# Patient Record
Sex: FEMALE | Race: WHITE | NOT HISPANIC OR LATINO | Employment: FULL TIME | URBAN - METROPOLITAN AREA
[De-identification: names, ages, dates, MRNs, and addresses within clinical notes are randomized per-mention and may not be internally consistent; named-entity substitution may affect disease eponyms.]

---

## 2022-07-20 ENCOUNTER — HOSPITAL ENCOUNTER (EMERGENCY)
Facility: CLINIC | Age: 32
Discharge: HOME | End: 2022-07-20
Admitting: PHYSICIAN ASSISTANT
Payer: COMMERCIAL

## 2022-07-20 VITALS
HEART RATE: 113 BPM | BODY MASS INDEX: 34.15 KG/M2 | WEIGHT: 200 LBS | HEIGHT: 64 IN | DIASTOLIC BLOOD PRESSURE: 101 MMHG | SYSTOLIC BLOOD PRESSURE: 153 MMHG | TEMPERATURE: 98.5 F | OXYGEN SATURATION: 98 %

## 2022-07-20 DIAGNOSIS — S61.208A AVULSION OF SKIN OF MIDDLE FINGER, INITIAL ENCOUNTER: ICD-10-CM

## 2022-07-20 PROCEDURE — 64450 NJX AA&/STRD OTHER PN/BRANCH: CPT | Mod: LT

## 2022-07-20 PROCEDURE — 272N000004 HC RX 272: Performed by: PHYSICIAN ASSISTANT

## 2022-07-20 PROCEDURE — 99283 EMERGENCY DEPT VISIT LOW MDM: CPT | Mod: 25

## 2022-07-20 RX ADMIN — Medication: at 18:15

## 2022-07-20 ASSESSMENT — ENCOUNTER SYMPTOMS
BRUISES/BLEEDS EASILY: 0
NUMBNESS: 0
WOUND: 1
WEAKNESS: 0

## 2022-07-20 NOTE — ED PROVIDER NOTES
EMERGENCY DEPARTMENT ENCOUNTER      NAME: Juanita Aviles  AGE: 32 year old female  YOB: 1990  MRN: [id number]  EVALUATION DATE & TIME: 7/20/2022  5:17 PM    PCP: No primary care provider on file.    ED PROVIDER: Geraldine Encinas PA-C      Chief Complaint   Patient presents with     Laceration         FINAL IMPRESSION:  1. Avulsion of skin of middle finger, initial encounter          MEDICAL DECISION MAKING:    Pertinent Labs & Imaging studies reviewed. (See chart for details)  32 year old female presents to the Emergency Department for evaluation of laceration to left middle finger while using a mandolin to cut vegetables.      Vitals reviewed and notable for elevated blood pressure and tachycardia. Patient is anxious about her laceration. On exam, 1 cm x 0.5 cm superficial skin avulsion to dorsum of left middle finger of the DIP. Bleeding controlled with direct pressure. Full ROM against resistance. Brisk capillary refill. Distal sensation intact.     1 ml lidocaine with epi injected into the wound for hemostasis and pain control. Wound was cleaned. Unable to perform primary closure due to skin avulsion. Surgicel pressure dressing placed over the wound. Instructions to remove dressing in 48 hours under running water to prevent scab from falling off. Bacitracin once bandage is removed. Discussed return precautions and patient discharged home in stable condition.     0 minutes of critical care time     ED COURSE:  5:46 PM I met with the patient, obtained history, performed an initial exam, and discussed options and plan for diagnostics and treatment here in the ED.  6:17 PM Patient discharged after being provided with extensive anticipatory guidance and given return precautions, importance of PCP follow-up emphasized.    At the conclusion of the encounter I discussed the results of all of the tests and the disposition. The questions were answered. The patient acknowledged understanding and was agreeable  "with the care plan.     MEDICATIONS GIVEN IN THE EMERGENCY:  Medications   oxidized cellulose (SURGICEL) pad ( Topical Given 7/20/22 1815)       NEW PRESCRIPTIONS STARTED AT TODAY'S ER VISIT  There are no discharge medications for this patient.           =================================================================    HPI:    Patient information was obtained from: Patient    Use of Interpretor: N/A      Juanitajoaquín Aviles is a 32 year old female with no pertinent history who presents to this ED via private vehicle for evaluation of laceration.    Patient was using a Mandolin to cut vegetables. The guard fell off and she knicked the dorsal aspect of her left middle finger over the DIP joint. Denies pain. Bleeding controlled with direct pressure. Full ROM. Not anticoagulated.     Tdap 2018.    REVIEW OF SYSTEMS:  Review of Systems   Skin: Positive for wound (laceration to left middle finger).   Neurological: Negative for weakness and numbness.   Hematological: Does not bruise/bleed easily.   All other systems reviewed and are negative.      PAST MEDICAL HISTORY:  No past medical history on file.    PAST SURGICAL HISTORY:  No past surgical history on file.        CURRENT MEDICATIONS:    No current facility-administered medications for this encounter.  No current outpatient medications on file.      ALLERGIES:  Allergies   Allergen Reactions     Amoxicillin Rash       FAMILY HISTORY:  No family history on file.    SOCIAL HISTORY:   Social History     Socioeconomic History     Marital status: Single       VITALS:  Patient Vitals for the past 24 hrs:   BP Temp Pulse SpO2 Height Weight   07/20/22 1715 (!) 153/101 98.5  F (36.9  C) 113 98 % 1.626 m (5' 4\") 90.7 kg (200 lb)       PHYSICAL EXAM  General: Appears in no acute distress, awake, alert, interactive.  Eyes: Conjunctivae non-injected. Sclera anicteric.  HENT: Atraumatic.  Neck: Supple.  Respiratory/Chest: Respiration unlabored.  Abdomen: non " distended  Musculoskeletal: Normal extremities. No edema or erythema.  Skin: Normal color. No rash or diaphoresis. 1 cm x 0.5 cm superficial skin avulsion to dorsum of left middle finger of the DIP. Bleeding controlled with direct pressure. Full ROM against resistance. Brisk capillary refill. Distal sensation intact.   Neurologic: Face symmetric, moves all extremities spontaneously. Speech clear.  Psychiatric: Oriented to person, place, and time. Affect appropriate.        Geraldine Encinas PA-C  Emergency Medicine  [facility]  7/20/2022     Geraldine Encinas PA-C  07/26/22 0907

## 2022-07-20 NOTE — DISCHARGE INSTRUCTIONS
Keep the dressing on for 48 hours.  When removing the dressing remove it under running water and do not pull the dressing from the skin as this will ripped the scab.  Apply bacitracin or Neosporin daily once the dressing is removed.  This will take a while to heal make sure you are covering it and keeping it clean.  If you develop signs or symptoms of an infection including redness, swelling, drainage or the wound is bleeding and you are unable to get it to stop with direct pressure return to the emergency department.

## 2022-07-20 NOTE — ED TRIAGE NOTES
Patient is here with a laceration to her middle finger after she cut it on a food slicer. Last tetanus is unknown. Is having a hard time getting it to stop bleeding. She verbalized a spot with missing skin.

## 2022-07-20 NOTE — ED NOTES
Bed: [facility]  Expected date:   Expected time:   Means of arrival:   Comments:  IV start, lab draws

## 2022-09-25 ENCOUNTER — HEALTH MAINTENANCE LETTER (OUTPATIENT)
Age: 32
End: 2022-09-25

## 2023-10-14 ENCOUNTER — HEALTH MAINTENANCE LETTER (OUTPATIENT)
Age: 33
End: 2023-10-14

## 2024-12-07 ENCOUNTER — HEALTH MAINTENANCE LETTER (OUTPATIENT)
Age: 34
End: 2024-12-07